# Patient Record
Sex: FEMALE | URBAN - METROPOLITAN AREA
[De-identification: names, ages, dates, MRNs, and addresses within clinical notes are randomized per-mention and may not be internally consistent; named-entity substitution may affect disease eponyms.]

---

## 2017-06-29 ENCOUNTER — APPOINTMENT (RX ONLY)
Dept: URBAN - METROPOLITAN AREA CLINIC 15 | Facility: CLINIC | Age: 57
Setting detail: DERMATOLOGY
End: 2017-06-29

## 2017-06-29 DIAGNOSIS — L70.0 ACNE VULGARIS: ICD-10-CM

## 2017-06-29 PROCEDURE — ? FACIAL

## 2017-06-29 ASSESSMENT — LOCATION ZONE DERM: LOCATION ZONE: FACE

## 2017-06-29 ASSESSMENT — LOCATION SIMPLE DESCRIPTION DERM
LOCATION SIMPLE: LEFT FOREHEAD
LOCATION SIMPLE: RIGHT FOREHEAD

## 2017-06-29 ASSESSMENT — LOCATION DETAILED DESCRIPTION DERM
LOCATION DETAILED: RIGHT MEDIAL FOREHEAD
LOCATION DETAILED: LEFT FOREHEAD
LOCATION DETAILED: RIGHT FOREHEAD

## 2017-06-29 NOTE — PROCEDURE: FACIAL
Mask Type (Optional): gel based
Facial Steaming: steamed
Exfoliation Type: glycolic exfoliant
Price (Use Numbers Only, No Special Characters Or $): Jayant
Treatment Type (Optional): Deep Cleanse Treatment
Extraction Method: cotton-tipped applicator
Detail Level: Zone
Comments (Non-Sticky): Extensive extractions closed comedones full forehead.